# Patient Record
Sex: FEMALE | Race: WHITE | NOT HISPANIC OR LATINO | Employment: FULL TIME | URBAN - METROPOLITAN AREA
[De-identification: names, ages, dates, MRNs, and addresses within clinical notes are randomized per-mention and may not be internally consistent; named-entity substitution may affect disease eponyms.]

---

## 2021-07-05 ENCOUNTER — APPOINTMENT (OUTPATIENT)
Dept: CT IMAGING | Facility: HOSPITAL | Age: 26
End: 2021-07-05

## 2021-07-05 ENCOUNTER — HOSPITAL ENCOUNTER (EMERGENCY)
Facility: HOSPITAL | Age: 26
Discharge: HOME | End: 2021-07-05
Attending: EMERGENCY MEDICINE | Admitting: EMERGENCY MEDICINE

## 2021-07-05 VITALS
HEIGHT: 64 IN | SYSTOLIC BLOOD PRESSURE: 108 MMHG | RESPIRATION RATE: 16 BRPM | OXYGEN SATURATION: 98 % | WEIGHT: 135 LBS | HEART RATE: 67 BPM | TEMPERATURE: 98.2 F | DIASTOLIC BLOOD PRESSURE: 77 MMHG | BODY MASS INDEX: 23.05 KG/M2

## 2021-07-05 DIAGNOSIS — D64.9 ANEMIA, UNSPECIFIED TYPE: ICD-10-CM

## 2021-07-05 DIAGNOSIS — R10.31 RIGHT LOWER QUADRANT ABDOMINAL PAIN: Primary | ICD-10-CM

## 2021-07-05 LAB
ALBUMIN SERPL-MCNC: 3.7 G/DL (ref 3.5–5.2)
ALBUMIN/GLOB SERPL: 1.5 G/DL
ALP SERPL-CCNC: 45 U/L (ref 39–117)
ALT SERPL W P-5'-P-CCNC: 12 U/L (ref 1–33)
ANION GAP SERPL CALCULATED.3IONS-SCNC: 9.1 MMOL/L (ref 5–15)
AST SERPL-CCNC: 17 U/L (ref 1–32)
BASOPHILS # BLD AUTO: 0.05 10*3/MM3 (ref 0–0.2)
BASOPHILS NFR BLD AUTO: 0.9 % (ref 0–1.5)
BILIRUB SERPL-MCNC: 0.3 MG/DL (ref 0–1.2)
BUN SERPL-MCNC: 9 MG/DL (ref 6–20)
BUN/CREAT SERPL: 11.8 (ref 7–25)
CALCIUM SPEC-SCNC: 8.3 MG/DL (ref 8.6–10.5)
CHLORIDE SERPL-SCNC: 108 MMOL/L (ref 98–107)
CO2 SERPL-SCNC: 22.9 MMOL/L (ref 22–29)
CREAT SERPL-MCNC: 0.76 MG/DL (ref 0.57–1)
DEPRECATED RDW RBC AUTO: 41.5 FL (ref 37–54)
EOSINOPHIL # BLD AUTO: 0.1 10*3/MM3 (ref 0–0.4)
EOSINOPHIL NFR BLD AUTO: 1.8 % (ref 0.3–6.2)
ERYTHROCYTE [DISTWIDTH] IN BLOOD BY AUTOMATED COUNT: 12.9 % (ref 12.3–15.4)
GFR SERPL CREATININE-BSD FRML MDRD: 92 ML/MIN/1.73
GLOBULIN UR ELPH-MCNC: 2.5 GM/DL
GLUCOSE SERPL-MCNC: 85 MG/DL (ref 65–99)
HCG SERPL QL: NEGATIVE
HCT VFR BLD AUTO: 29.4 % (ref 34–46.6)
HGB BLD-MCNC: 9.9 G/DL (ref 12–15.9)
IMM GRANULOCYTES # BLD AUTO: 0.02 10*3/MM3 (ref 0–0.05)
IMM GRANULOCYTES NFR BLD AUTO: 0.4 % (ref 0–0.5)
LIPASE SERPL-CCNC: 40 U/L (ref 13–60)
LYMPHOCYTES # BLD AUTO: 1.82 10*3/MM3 (ref 0.7–3.1)
LYMPHOCYTES NFR BLD AUTO: 33.2 % (ref 19.6–45.3)
MCH RBC QN AUTO: 30.3 PG (ref 26.6–33)
MCHC RBC AUTO-ENTMCNC: 33.7 G/DL (ref 31.5–35.7)
MCV RBC AUTO: 89.9 FL (ref 79–97)
MONOCYTES # BLD AUTO: 0.65 10*3/MM3 (ref 0.1–0.9)
MONOCYTES NFR BLD AUTO: 11.9 % (ref 5–12)
NEUTROPHILS NFR BLD AUTO: 2.84 10*3/MM3 (ref 1.7–7)
NEUTROPHILS NFR BLD AUTO: 51.8 % (ref 42.7–76)
NRBC BLD AUTO-RTO: 0 /100 WBC (ref 0–0.2)
PLATELET # BLD AUTO: 316 10*3/MM3 (ref 140–450)
PMV BLD AUTO: 9.2 FL (ref 6–12)
POTASSIUM SERPL-SCNC: 3.7 MMOL/L (ref 3.5–5.2)
PROT SERPL-MCNC: 6.2 G/DL (ref 6–8.5)
RBC # BLD AUTO: 3.27 10*6/MM3 (ref 3.77–5.28)
SODIUM SERPL-SCNC: 140 MMOL/L (ref 136–145)
WBC # BLD AUTO: 5.48 10*3/MM3 (ref 3.4–10.8)

## 2021-07-05 PROCEDURE — 83690 ASSAY OF LIPASE: CPT | Performed by: EMERGENCY MEDICINE

## 2021-07-05 PROCEDURE — 84703 CHORIONIC GONADOTROPIN ASSAY: CPT | Performed by: EMERGENCY MEDICINE

## 2021-07-05 PROCEDURE — 85025 COMPLETE CBC W/AUTO DIFF WBC: CPT | Performed by: EMERGENCY MEDICINE

## 2021-07-05 PROCEDURE — 25010000002 IOPAMIDOL 61 % SOLUTION: Performed by: EMERGENCY MEDICINE

## 2021-07-05 PROCEDURE — 74177 CT ABD & PELVIS W/CONTRAST: CPT

## 2021-07-05 PROCEDURE — 80053 COMPREHEN METABOLIC PANEL: CPT | Performed by: EMERGENCY MEDICINE

## 2021-07-05 PROCEDURE — 99283 EMERGENCY DEPT VISIT LOW MDM: CPT

## 2021-07-05 RX ORDER — SODIUM CHLORIDE 0.9 % (FLUSH) 0.9 %
10 SYRINGE (ML) INJECTION AS NEEDED
Status: DISCONTINUED | OUTPATIENT
Start: 2021-07-05 | End: 2021-07-05 | Stop reason: HOSPADM

## 2021-07-05 RX ADMIN — IOPAMIDOL 85 ML: 612 INJECTION, SOLUTION INTRAVENOUS at 10:15

## 2021-07-05 NOTE — ED NOTES
Called CT to see why pt has not gone over for scans yet - spoke with Sulema who said this pt will be next. Pt updated.      Dimas Manuel RN  07/05/21 5077

## 2021-07-05 NOTE — ED TRIAGE NOTES
Right sided abd pain since waking at 0645.  Vomit x 1.    Patient was placed in face mask during first look triage.  Patient was wearing a face mask throughout encounter.  I wore personal protective equipment throughout the encounter.  Hand hygiene was performed before and after patient encounter.

## 2021-07-05 NOTE — ED TRIAGE NOTES
"Pt states her right side (\"uterine wall\") started hurting last night and the pain is now radiated into her right LQ. She has hx of ruptured ovarian cysts and states this feels similar. Pt describes the pain asa knife across her right side. No history of kidney stones. Pt still has her appendix and gallbladder.She is allergic to shrimp and dairy but has eaten neither. Pt denies vaginal discharge, urinary changes, constipation or diarrhea.     Patient was wearing a face mask when I entered the room and they continued to wear a mask throughout our encounter. I wore PPE including gloves, eye protection, and a surgical mask whenever I was in the room with patient. Hand hygiene performed.  "

## 2021-07-05 NOTE — ED NOTES
Pt called out to ask why she has not gone to CT yet and I informed her they are waiting for urine results to ensure she is not pregnant. I requested a serum HCG from Dr. Ward - labs sent. Pt verbalized understanding.      Dimas Manuel RN  07/05/21 0857

## 2021-07-05 NOTE — ED PROVIDER NOTES
" EMERGENCY DEPARTMENT ENCOUNTER    Room Number:  09/09  Date seen:  7/5/2021  PCP: Provider, No Known  Historian: Patient      HPI:  Chief Complaint: Abdominal pain  A complete HPI/ROS/PMH/PSH/SH/FH are unobtainable due to: Nothing  Context: Joseline Kelly is a 26 y.o. female who presents to the ED c/o right lower abdominal pain onset this morning around 6:45 AM.  It woke her from sleep.  The pain actually initially started \"in her uterus\".,  Specifically the patient describes that the pain seemed to be localized in her lower pelvis inside her vagina.  The pain then felt like a sharp knife going across her lower abdomen to the right lower quadrant.  She threw up once on the way here.  Her nausea has since resolved.  She denies fever or chills.  She denies dysuria.  She denies vaginal bleeding or vaginal discharge.  She is on oral contraceptive therapy.  Last menstrual period was exactly 1 month ago.  She denies being pregnant to her knowledge.  The pain is currently mild.  She experienced similar symptoms 1 time before and was diagnosed with an ovarian cyst that had ruptured.            PAST MEDICAL HISTORY  Active Ambulatory Problems     Diagnosis Date Noted   • No Active Ambulatory Problems     Resolved Ambulatory Problems     Diagnosis Date Noted   • No Resolved Ambulatory Problems     No Additional Past Medical History         PAST SURGICAL HISTORY  History reviewed. No pertinent surgical history.      FAMILY HISTORY  History reviewed. No pertinent family history.      SOCIAL HISTORY  Social History     Socioeconomic History   • Marital status: Single     Spouse name: Not on file   • Number of children: Not on file   • Years of education: Not on file   • Highest education level: Not on file   Tobacco Use   • Smoking status: Never Smoker   Substance and Sexual Activity   • Alcohol use: Yes   • Drug use: Never   • Sexual activity: Yes     Partners: Male     Birth control/protection: OCP "         ALLERGIES  Zithromax [azithromycin]        REVIEW OF SYSTEMS  Review of Systems   Review of all 14 systems is negative other than stated in the HPI above.      PHYSICAL EXAM  ED Triage Vitals   Temp Heart Rate Resp BP SpO2   07/05/21 0725 07/05/21 0725 07/05/21 0725 07/05/21 0736 07/05/21 0725   98.2 °F (36.8 °C) 99 16 120/80 98 %      Temp src Heart Rate Source Patient Position BP Location FiO2 (%)   07/05/21 0725 07/05/21 0725 -- -- --   Tympanic Monitor            GENERAL: Awake and alert, no acute distress  HENT: nares patent  EYES: no scleral icterus  CV: regular rhythm, normal rate  RESPIRATORY: normal effort  ABDOMEN: soft, mild tenderness in the right lower quadrant without rebound or guarding  MUSCULOSKELETAL: no deformity  NEURO: alert, moves all extremities, follows commands  PSYCH:  calm, cooperative  SKIN: warm, dry    Vital signs and nursing notes reviewed.          LAB RESULTS  Recent Results (from the past 24 hour(s))   Comprehensive Metabolic Panel    Collection Time: 07/05/21  7:59 AM    Specimen: Blood   Result Value Ref Range    Glucose 85 65 - 99 mg/dL    BUN 9 6 - 20 mg/dL    Creatinine 0.76 0.57 - 1.00 mg/dL    Sodium 140 136 - 145 mmol/L    Potassium 3.7 3.5 - 5.2 mmol/L    Chloride 108 (H) 98 - 107 mmol/L    CO2 22.9 22.0 - 29.0 mmol/L    Calcium 8.3 (L) 8.6 - 10.5 mg/dL    Total Protein 6.2 6.0 - 8.5 g/dL    Albumin 3.70 3.50 - 5.20 g/dL    ALT (SGPT) 12 1 - 33 U/L    AST (SGOT) 17 1 - 32 U/L    Alkaline Phosphatase 45 39 - 117 U/L    Total Bilirubin 0.3 0.0 - 1.2 mg/dL    eGFR Non African Amer 92 >60 mL/min/1.73    Globulin 2.5 gm/dL    A/G Ratio 1.5 g/dL    BUN/Creatinine Ratio 11.8 7.0 - 25.0    Anion Gap 9.1 5.0 - 15.0 mmol/L   Lipase    Collection Time: 07/05/21  7:59 AM    Specimen: Blood   Result Value Ref Range    Lipase 40 13 - 60 U/L   CBC Auto Differential    Collection Time: 07/05/21  7:59 AM    Specimen: Blood   Result Value Ref Range    WBC 5.48 3.40 - 10.80 10*3/mm3     RBC 3.27 (L) 3.77 - 5.28 10*6/mm3    Hemoglobin 9.9 (L) 12.0 - 15.9 g/dL    Hematocrit 29.4 (L) 34.0 - 46.6 %    MCV 89.9 79.0 - 97.0 fL    MCH 30.3 26.6 - 33.0 pg    MCHC 33.7 31.5 - 35.7 g/dL    RDW 12.9 12.3 - 15.4 %    RDW-SD 41.5 37.0 - 54.0 fl    MPV 9.2 6.0 - 12.0 fL    Platelets 316 140 - 450 10*3/mm3    Neutrophil % 51.8 42.7 - 76.0 %    Lymphocyte % 33.2 19.6 - 45.3 %    Monocyte % 11.9 5.0 - 12.0 %    Eosinophil % 1.8 0.3 - 6.2 %    Basophil % 0.9 0.0 - 1.5 %    Immature Grans % 0.4 0.0 - 0.5 %    Neutrophils, Absolute 2.84 1.70 - 7.00 10*3/mm3    Lymphocytes, Absolute 1.82 0.70 - 3.10 10*3/mm3    Monocytes, Absolute 0.65 0.10 - 0.90 10*3/mm3    Eosinophils, Absolute 0.10 0.00 - 0.40 10*3/mm3    Basophils, Absolute 0.05 0.00 - 0.20 10*3/mm3    Immature Grans, Absolute 0.02 0.00 - 0.05 10*3/mm3    nRBC 0.0 0.0 - 0.2 /100 WBC   hCG, Serum, Qualitative    Collection Time: 21  8:55 AM    Specimen: Blood   Result Value Ref Range    HCG Qualitative Negative Negative       Ordered the above labs and reviewed the results.        RADIOLOGY  CT Abdomen Pelvis With Contrast    Result Date: 2021  CT SCAN OF THE ABDOMEN AND PELVIS WITH INTRAVENOUS CONTRAST  HISTORY: Right lower quadrant pain. History of ovarian cysts.  FINDINGS: The CT scan was performed as an emergency procedure through the abdomen and pelvis with intravenous contrast and demonstrates the followin. The kidneys are normal in size and there is no evidence of renal stone or obstruction. There is a very tiny left renal cyst. 2. The lung bases are clear. The liver, spleen, pancreas, and both adrenal glands are unremarkable. The gallbladder appears normal. The aorta and retroperitoneal structures appear normal. 3. The large and small bowel loops are normal in caliber and show no inflammatory change. No abnormality is seen in the region of the appendix. 4. In the pelvis the uterus is normal in size. The adnexal structures are unremarkable  by CT scan. There is no evidence of dominant are complicated ovarian cyst or free fluid.      Radiation dose reduction techniques were utilized, including automated exposure control and exposure modulation based on body size.  This report was finalized on 7/5/2021 11:22 AM by Dr. Mikhail Scanlon M.D.        Ordered the above noted radiological studies. Reviewed by me in PACS.            PROCEDURES  Procedures            MEDICATIONS GIVEN IN ER  Medications   iopamidol (ISOVUE-300) 61 % injection 100 mL (85 mL Intravenous Given 7/5/21 1015)                   MEDICAL DECISION MAKING, PROGRESS, and CONSULTS    All labs have been independently reviewed by me.  All radiology studies have been reviewed by me and discussed with radiologist dictating the report.   EKG's independently viewed and interpreted by me.  Discussion below represents my analysis of pertinent findings related to patient's condition, differential diagnosis, treatment plan and final disposition.      Differential diagnosis includes but is not limited to:  Ovarian cyst with hemorrhage or rupture  Acute appendicitis  Ovarian torsion  Ectopic pregnancy  Tubo-ovarian abscess  Ureteral calculus      ED Course as of Jul 05 1348   Mon Jul 05, 2021   1007 HCG Qualitative: Negative [JR]   1007 Glucose: 85 [JR]   1007 Chloride(!): 108 [JR]   1007 Hemoglobin(!): 9.9 [JR]   1007 Hematocrit(!): 29.4 [JR]   1007 WBC: 5.48 [JR]   1007 Lipase: 40 [JR]   1100 I discussed the CT abdomen with Dr. Scanlon, radiologist, who reports no acute intra-abdominal findings, normal appendix, no significant free fluid in pelvis.    [JR]   1109 Patient reassessed.  Her pain is much improved.  I explained that her CT abdomen appears reassuring.  I explained that she may have had a small cyst rupture but this is only speculation.  I also explained that I cannot completely exclude ovarian torsion although her ovary is not appear abnormal on the CT scan.  I offered a pelvic ultrasound  for further evaluation but patient reports that she prefers to follow-up with her OB/GYN.  I explained that given her pain has mostly subsided at this time that is not unreasonable, but I cautioned her that if her pain becomes more intense and severe as it was earlier today, she should return to the ER immediately for consideration of pelvic ultrasound to further evaluate for intermittent torsion.  Patient voiced understanding.  Additionally, I informed her of her hemoglobin 9.9 today.  She denies previous history of anemia.  She did donate blood about 2 weeks ago which is likely contributing to her mild anemia today.  She denies history of heavy menstrual.  She denies history of black or bloody stool recently.  I recommend she take a daily iron supplement and follow-up with her PCP.    [JR]      ED Course User Index  [JR] Darío Ward MD              I wore a mask, face shield, and gloves during this patient encounter.  Patient also wearing a surgical mask.  Hand hygeine performed before and after seeing the patient.    DIAGNOSIS  Final diagnoses:   Right lower quadrant abdominal pain   Anemia, unspecified type         DISPOSITION  DISCHARGE    Patient discharged in stable condition.    Reviewed implications of results, diagnosis, meds, responsibility to follow up, warning signs and symptoms of possible worsening, potential complications and reasons to return to ER.    Patient/Family voiced understanding of above instructions.    Discussed plan for discharge, as there is no emergent indication for admission. Patient referred to primary care provider for BP management due to today's BP. Pt/family is agreeable and understands need for follow up and repeat testing.  Pt is aware that discharge does not mean that nothing is wrong but it indicates no emergency is present that requires admission and they must continue care with follow-up as given below or physician of their choice.     FOLLOW-UP  PATIENT  [facility] [address]  [phone]  Call in 1 day  or follow up with your OB/ GYN         Medication List      No changes were made to your prescriptions during this visit.                   Latest Documented Vital Signs:  As of 13:48 EDT  BP- 108/77 HR- 67 Temp- 98.2 °F (36.8 °C) (Tympanic) O2 sat- 98%        --    Please note that portions of this were completed with a voice recognition program.          Darío Ward MD  07/05/21 3800

## 2022-05-25 ENCOUNTER — OFFICE VISIT (OUTPATIENT)
Dept: OBSTETRICS AND GYNECOLOGY | Facility: CLINIC | Age: 27
End: 2022-05-25

## 2022-05-25 VITALS
SYSTOLIC BLOOD PRESSURE: 108 MMHG | WEIGHT: 149 LBS | BODY MASS INDEX: 25.44 KG/M2 | DIASTOLIC BLOOD PRESSURE: 64 MMHG | HEIGHT: 64 IN

## 2022-05-25 DIAGNOSIS — R10.84 GENERALIZED ABDOMINAL PAIN: ICD-10-CM

## 2022-05-25 DIAGNOSIS — E28.2 POLYCYSTIC OVARIAN SYNDROME: Primary | ICD-10-CM

## 2022-05-25 PROCEDURE — 99203 OFFICE O/P NEW LOW 30 MIN: CPT | Performed by: OBSTETRICS & GYNECOLOGY

## 2022-05-25 RX ORDER — NORETHINDRONE ACETATE AND ETHINYL ESTRADIOL 1MG-20(21)
1 KIT ORAL DAILY
Qty: 28 TABLET | Refills: 12 | Status: SHIPPED | OUTPATIENT
Start: 2022-05-25 | End: 2022-06-15

## 2022-05-25 RX ORDER — DROSPIRENONE AND ETHINYL ESTRADIOL 0.03MG-3MG
KIT ORAL
COMMUNITY
Start: 2022-05-21 | End: 2022-05-25

## 2022-05-25 NOTE — PROGRESS NOTES
Subjective   Joseline Kelly is a 27 y.o. female.     Cc:  Establish with new gynecologist    History of Present Illness - Patient is a 27 year old nulligravid female who presents to establish care with new gynecologist after moving from [place] to [place].  She has a long history of PCOS, managed with OCP.  She has symptoms of hair growth, mood/weight changes and recurrent ovarian cysts.  She was last on Halina OCP and has had a 2 week lapse in pills.  She is wanting to change to different pill.  She has tried IUD in past for symptoms without good success.  She is currently sexually active and declines STD testing, denies exposure.  She reports history of abnormal pap but states pap was normal 6 months ago.  Patient reports recurrent nausea, GI issues.    The following portions of the patient's history were reviewed and updated as appropriate:   She  has a past medical history of Abnormal Pap smear of cervix, COVID-19 (11/2020), HPV (human papilloma virus) infection, and Polycystic ovary syndrome.  She  has a past surgical history that includes Colposcopy (2018).  Her family history includes Breast cancer in her mother; Heart attack in her paternal grandfather; Hyperlipidemia in her father; Migraine headaches in her father; Multiple sclerosis in her maternal grandmother; No Known Problems in her sister; Thyroid disease in her paternal grandmother.  She  reports that she has never smoked. She does not have any smokeless tobacco history on file. She reports current alcohol use. She reports that she does not use drugs.  Current Outpatient Medications   Medication Sig Dispense Refill   • norethindrone-ethinyl estradiol FE (Junel FE 1/20) 1-20 MG-MCG per tablet Take 1 tablet by mouth Daily. 28 tablet 12     No current facility-administered medications for this visit.     She is allergic to azithromycin..    Review of Systems   Constitutional: Negative for chills and fever.   Gastrointestinal: Positive for  abdominal pain and nausea.   Genitourinary: Negative for menstrual problem.       Objective   Physical Exam  Vitals reviewed. Exam conducted with a chaperone present.   Constitutional:       Appearance: Normal appearance.   HENT:      Right Ear: External ear normal.      Left Ear: External ear normal.      Nose: Nose normal.   Eyes:      Conjunctiva/sclera: Conjunctivae normal.   Cardiovascular:      Rate and Rhythm: Normal rate.   Pulmonary:      Effort: Pulmonary effort is normal.   Abdominal:      Tenderness: There is no abdominal tenderness. There is no guarding or rebound.   Musculoskeletal:         General: Normal range of motion.      Cervical back: Normal range of motion.   Skin:     General: Skin is warm.   Neurological:      General: No focal deficit present.      Mental Status: She is alert.      Coordination: Coordination normal.   Psychiatric:         Mood and Affect: Mood normal.         Behavior: Behavior normal.         Thought Content: Thought content normal.         Judgment: Judgment normal.         Assessment & Plan   Diagnoses and all orders for this visit:    1. Polycystic ovarian syndrome (Primary)  -     norethindrone-ethinyl estradiol FE (Junel FE 1/20) 1-20 MG-MCG per tablet; Take 1 tablet by mouth Daily.  Dispense: 28 tablet; Refill: 12  -     Discussed management of PCOS works well with OCP.  This especially true with patient with dermatologic manifestations.      2.  Abdominal Pain        -     GI referral ordered.

## 2022-05-27 ENCOUNTER — PATIENT MESSAGE (OUTPATIENT)
Dept: OBSTETRICS AND GYNECOLOGY | Facility: CLINIC | Age: 27
End: 2022-05-27

## 2022-05-27 ENCOUNTER — PATIENT ROUNDING (BHMG ONLY) (OUTPATIENT)
Dept: OBSTETRICS AND GYNECOLOGY | Facility: CLINIC | Age: 27
End: 2022-05-27

## 2022-05-27 NOTE — PROGRESS NOTES
My chart message has been sent to the patient for PATIENT ROUNDING with [facility].

## 2022-06-09 ENCOUNTER — TELEPHONE (OUTPATIENT)
Dept: OBSTETRICS AND GYNECOLOGY | Facility: CLINIC | Age: 27
End: 2022-06-09

## 2022-06-09 NOTE — TELEPHONE ENCOUNTER
Left message for patient to call office. Does she need her records from Dr. Valencia sent to her? If so, please confirm address. Thanks-Valarie

## 2022-06-15 ENCOUNTER — OFFICE VISIT (OUTPATIENT)
Dept: GASTROENTEROLOGY | Facility: CLINIC | Age: 27
End: 2022-06-15

## 2022-06-15 ENCOUNTER — PATIENT ROUNDING (BHMG ONLY) (OUTPATIENT)
Dept: GASTROENTEROLOGY | Facility: CLINIC | Age: 27
End: 2022-06-15

## 2022-06-15 VITALS
TEMPERATURE: 98.4 F | DIASTOLIC BLOOD PRESSURE: 78 MMHG | SYSTOLIC BLOOD PRESSURE: 114 MMHG | HEIGHT: 64 IN | BODY MASS INDEX: 25.95 KG/M2 | OXYGEN SATURATION: 96 % | HEART RATE: 83 BPM | WEIGHT: 152 LBS

## 2022-06-15 DIAGNOSIS — R14.0 BLOATING SYMPTOM: Primary | ICD-10-CM

## 2022-06-15 DIAGNOSIS — R19.7 DIARRHEA, UNSPECIFIED TYPE: ICD-10-CM

## 2022-06-15 DIAGNOSIS — R12 HEARTBURN: ICD-10-CM

## 2022-06-15 DIAGNOSIS — R11.0 NAUSEA: ICD-10-CM

## 2022-06-15 DIAGNOSIS — K62.5 RECTAL BLEEDING: ICD-10-CM

## 2022-06-15 PROCEDURE — 99204 OFFICE O/P NEW MOD 45 MIN: CPT | Performed by: NURSE PRACTITIONER

## 2022-06-15 NOTE — PROGRESS NOTES
Jackie 15, 2022    Hello, may I speak with Joseline Kelly?    My name is Sushila      I am  with [facility]  [address]  [place] [address] [phone].    Before we get started may I verify your date of birth? 1995    I am calling to officially welcome you to our practice and ask about your recent visit. Is this a good time to talk?Yes     Tell me about your visit with us. What things went well?  Everything went really well.  Diana was very easy to talk to       We're always looking for ways to make our patients' experiences even better. Do you have recommendations on ways we may improve? Patient didn't like that she felt pressured to pay for her lab work while she was in office.  Explained that was a policy of [facility].    Overall were you satisfied with your first visit to our practice? Yes     I appreciate you taking the time to speak with me today. Is there anything else I can do for you? Patient wanted the number to financial services so she can get an estimated OOP for her scopes that were ordered- this was provided       Thank you, and have a great day.

## 2022-06-15 NOTE — PROGRESS NOTES
Chief Complaint   Patient presents with   • Bloated       HPI    Joseline Kelly is a  27 y.o. female here to establish care as a new patient for multiple GI complaints.    This patient will also follow with Dr. Ibrahim.    She has a past medical history of polycystic ovarian syndrome, COVID-19, and HPV.  She was referred by her gynecologist.    Patient reports issues with digestive symptoms dating back to high school.  Worse over the last 1 year.  Symptoms include intermittent rectal bleeding with bright red blood in the toilet paper, episodic diarrhea, generalized abdominal bloating, nausea, early satiety, and heartburn.  Symptoms made worse with dairy products.  She has had food allergy testing and was told she was allergic to shellfish.    No vomiting, poor appetite, or weight loss in fact she has gained 20 pounds of the past 1 year.    She still has her gallbladder.    No family history of colon cancer.  Her sister has celiac disease.    Past Medical History:   Diagnosis Date   • Abnormal Pap smear of cervix    • COVID-19 11/2020 1/2022   • HPV (human papilloma virus) infection    • Polycystic ovary syndrome        Past Surgical History:   Procedure Laterality Date   • COLPOSCOPY  2018   • WISDOM TOOTH EXTRACTION         Scheduled Meds:     Continuous Infusions: No current facility-administered medications for this visit.      PRN Meds:     Allergies   Allergen Reactions   • Azithromycin Hives       Social History     Socioeconomic History   • Marital status: Single   Tobacco Use   • Smoking status: Never Smoker   • Smokeless tobacco: Never Used   Vaping Use   • Vaping Use: Never used   Substance and Sexual Activity   • Alcohol use: Yes     Comment: social   • Drug use: Never   • Sexual activity: Yes     Partners: Male       Family History   Problem Relation Age of Onset   • Breast cancer Mother    • Hyperlipidemia Father    • Migraine headaches Father    • No Known Problems Sister    • Multiple sclerosis  Maternal Grandmother    • Thyroid disease Paternal Grandmother    • Heart attack Paternal Grandfather    • Colon cancer Paternal Great-Grandfather        Review of Systems   Constitutional: Negative for activity change, appetite change, fatigue and unexpected weight change.   HENT: Negative for trouble swallowing.    Eyes: Negative.    Respiratory: Negative.    Cardiovascular: Negative.    Gastrointestinal: Positive for diarrhea and nausea. Negative for abdominal distention, abdominal pain, anal bleeding, blood in stool, constipation, rectal pain and vomiting.        + bloating   Endocrine: Negative.    Genitourinary: Negative.    Musculoskeletal: Negative.    Allergic/Immunologic: Negative.    Neurological: Negative.    Hematological: Negative.    Psychiatric/Behavioral: Negative.        Vitals:    06/15/22 1302   BP: 114/78   Pulse: 83   Temp: 98.4 °F (36.9 °C)   SpO2: 96%       Physical Exam  Constitutional:       Appearance: She is well-developed.   Abdominal:      General: Bowel sounds are normal. There is no distension.      Palpations: Abdomen is soft. There is no mass.      Tenderness: There is no abdominal tenderness. There is no guarding.      Hernia: No hernia is present.   Skin:     General: Skin is warm and dry.      Capillary Refill: Capillary refill takes less than 2 seconds.   Neurological:      Mental Status: She is alert and oriented to person, place, and time.   Psychiatric:         Behavior: Behavior normal.     Assessment    Diagnoses and all orders for this visit:    1. Bloating symptom (Primary)  -     CBC & Differential  -     Comprehensive Metabolic Panel  -     C-reactive Protein  -     Sedimentation Rate  -     Celiac Comprehensive Panel  -     Food Allergy Profile  -     TSH  -     Case Request; Standing  -     Case Request    2. Diarrhea, unspecified type  -     CBC & Differential  -     Comprehensive Metabolic Panel  -     C-reactive Protein  -     Sedimentation Rate  -     Celiac  Comprehensive Panel  -     Food Allergy Profile  -     TSH  -     Case Request; Standing  -     Case Request    3. Rectal bleeding  -     CBC & Differential  -     Comprehensive Metabolic Panel  -     C-reactive Protein  -     Sedimentation Rate  -     Celiac Comprehensive Panel  -     Food Allergy Profile  -     TSH  -     Case Request; Standing  -     Case Request    4. Nausea  -     CBC & Differential  -     Comprehensive Metabolic Panel  -     C-reactive Protein  -     Sedimentation Rate  -     Celiac Comprehensive Panel  -     Food Allergy Profile  -     TSH  -     Case Request; Standing  -     Case Request    5. Heartburn  -     CBC & Differential  -     Comprehensive Metabolic Panel  -     C-reactive Protein  -     Sedimentation Rate  -     Celiac Comprehensive Panel  -     Food Allergy Profile  -     TSH  -     Case Request; Standing  -     Case Request       Plan    Arrange EGD and colonoscopy with Dr. Ibrahim for the above-mentioned symptoms.    Patient would like out-of-pocket expense before booking procedure and this was discussed at checkout.  Risk/benefits of procedure reviewed with patient all questions were answered.    Labs today as above.  Further recommendations and follow-up pending aforementioned work-up.         DAKOTA Grajeda  [facility]  [address] [place] [address]  Office: [phone]

## 2022-06-16 LAB
ALBUMIN SERPL-MCNC: 3.8 G/DL (ref 3.9–5)
ALBUMIN/GLOB SERPL: 1.5 {RATIO} (ref 1.2–2.2)
ALP SERPL-CCNC: 66 IU/L (ref 44–121)
ALT SERPL-CCNC: 12 IU/L (ref 0–32)
AST SERPL-CCNC: 13 IU/L (ref 0–40)
BASOPHILS # BLD AUTO: 0.1 X10E3/UL (ref 0–0.2)
BASOPHILS NFR BLD AUTO: 1 %
BILIRUB SERPL-MCNC: 0.2 MG/DL (ref 0–1.2)
BUN SERPL-MCNC: 9 MG/DL (ref 6–20)
BUN/CREAT SERPL: 13 (ref 9–23)
CALCIUM SERPL-MCNC: 8.9 MG/DL (ref 8.7–10.2)
CHLORIDE SERPL-SCNC: 106 MMOL/L (ref 96–106)
CO2 SERPL-SCNC: 22 MMOL/L (ref 20–29)
CREAT SERPL-MCNC: 0.67 MG/DL (ref 0.57–1)
CRP SERPL-MCNC: <1 MG/L (ref 0–10)
EGFRCR SERPLBLD CKD-EPI 2021: 123 ML/MIN/1.73
ENDOMYSIUM IGA SER QL: NEGATIVE
EOSINOPHIL # BLD AUTO: 0.1 X10E3/UL (ref 0–0.4)
EOSINOPHIL NFR BLD AUTO: 1 %
ERYTHROCYTE [DISTWIDTH] IN BLOOD BY AUTOMATED COUNT: 12.1 % (ref 11.7–15.4)
ERYTHROCYTE [SEDIMENTATION RATE] IN BLOOD BY WESTERGREN METHOD: 2 MM/HR (ref 0–32)
GLIADIN PEPTIDE IGA SER-ACNC: 6 UNITS (ref 0–19)
GLIADIN PEPTIDE IGG SER-ACNC: 2 UNITS (ref 0–19)
GLOBULIN SER CALC-MCNC: 2.6 G/DL (ref 1.5–4.5)
GLUCOSE SERPL-MCNC: 75 MG/DL (ref 65–99)
HCT VFR BLD AUTO: 37.8 % (ref 34–46.6)
HGB BLD-MCNC: 12.6 G/DL (ref 11.1–15.9)
IGA SERPL-MCNC: 194 MG/DL (ref 87–352)
IMM GRANULOCYTES # BLD AUTO: 0 X10E3/UL (ref 0–0.1)
IMM GRANULOCYTES NFR BLD AUTO: 0 %
LYMPHOCYTES # BLD AUTO: 2.1 X10E3/UL (ref 0.7–3.1)
LYMPHOCYTES NFR BLD AUTO: 34 %
MCH RBC QN AUTO: 31.1 PG (ref 26.6–33)
MCHC RBC AUTO-ENTMCNC: 33.3 G/DL (ref 31.5–35.7)
MCV RBC AUTO: 93 FL (ref 79–97)
MONOCYTES # BLD AUTO: 0.6 X10E3/UL (ref 0.1–0.9)
MONOCYTES NFR BLD AUTO: 10 %
NEUTROPHILS # BLD AUTO: 3.4 X10E3/UL (ref 1.4–7)
NEUTROPHILS NFR BLD AUTO: 54 %
PLATELET # BLD AUTO: 250 X10E3/UL (ref 150–450)
POTASSIUM SERPL-SCNC: 4 MMOL/L (ref 3.5–5.2)
PROT SERPL-MCNC: 6.4 G/DL (ref 6–8.5)
RBC # BLD AUTO: 4.05 X10E6/UL (ref 3.77–5.28)
SODIUM SERPL-SCNC: 141 MMOL/L (ref 134–144)
TSH SERPL DL<=0.005 MIU/L-ACNC: 2.4 UIU/ML (ref 0.45–4.5)
TTG IGA SER-ACNC: <2 U/ML (ref 0–3)
TTG IGG SER-ACNC: <2 U/ML (ref 0–5)
WBC # BLD AUTO: 6.2 X10E3/UL (ref 3.4–10.8)

## 2022-06-22 ENCOUNTER — TELEPHONE (OUTPATIENT)
Dept: GASTROENTEROLOGY | Facility: CLINIC | Age: 27
End: 2022-06-22

## 2022-06-22 LAB
CLAM IGE QN: <0.1 KU/L
CODFISH IGE QN: <0.1 KU/L
CONV CLASS DESCRIPTION: ABNORMAL
CORN IGE QN: <0.1 KU/L
COW MILK IGE QN: <0.1 KU/L
EGG WHITE IGE QN: <0.1 KU/L
PEANUT IGE QN: <0.1 KU/L
SCALLOP IGE QN: <0.1 KU/L
SESAME SEED IGE QN: 0.14 KU/L
SHRIMP IGE QN: <0.1 KU/L
SOYBEAN IGE QN: <0.1 KU/L
WALNUT IGE QN: <0.1 KU/L
WHEAT IGE QN: <0.1 KU/L

## 2022-06-22 NOTE — TELEPHONE ENCOUNTER
----- Message from Joseline Kelly sent at 6/22/2022  1:46 PM EDT -----  Regarding: Lab results  Can someone call to discuss. This is concerning and I am taken back I only received a message about this.

## 2022-06-22 NOTE — TELEPHONE ENCOUNTER
Patient called. She states she recently saw an allergist and she reacted to shrimp and questions why it did not show up on her allergy test. Advised with the skin test is more sensitive than a blood test.   Patient states she would like to move forward with her EGD and colonoscopy but would like to know what her out of pocket cost would be. Advised will send an update to our insurance persons.   She verb understanding.   Update to Russel.

## 2022-08-01 ENCOUNTER — TELEPHONE (OUTPATIENT)
Dept: GASTROENTEROLOGY | Facility: CLINIC | Age: 27
End: 2022-08-01

## 2023-07-06 PROBLEM — E28.2 PCOS (POLYCYSTIC OVARIAN SYNDROME): Status: ACTIVE | Noted: 2023-07-06

## 2023-07-06 PROBLEM — T78.1XXA GASTROINTESTINAL FOOD SENSITIVITY: Status: ACTIVE | Noted: 2023-07-06

## 2023-07-06 PROBLEM — Z00.00 ANNUAL PHYSICAL EXAM: Status: ACTIVE | Noted: 2023-07-06

## 2024-01-23 ENCOUNTER — OFFICE VISIT (OUTPATIENT)
Dept: INTERNAL MEDICINE | Facility: CLINIC | Age: 29
End: 2024-01-23
Payer: COMMERCIAL

## 2024-01-23 VITALS
WEIGHT: 166 LBS | HEART RATE: 83 BPM | SYSTOLIC BLOOD PRESSURE: 120 MMHG | DIASTOLIC BLOOD PRESSURE: 70 MMHG | HEIGHT: 63 IN | OXYGEN SATURATION: 97 % | BODY MASS INDEX: 29.41 KG/M2

## 2024-01-23 DIAGNOSIS — M79.621 TENDERNESS OF RIGHT AXILLA: Primary | ICD-10-CM

## 2024-01-23 DIAGNOSIS — Z80.3 FAMILY HISTORY OF BREAST CANCER: ICD-10-CM

## 2024-01-23 PROCEDURE — 99213 OFFICE O/P EST LOW 20 MIN: CPT | Performed by: NURSE PRACTITIONER

## 2024-01-23 NOTE — PROGRESS NOTES
"Chief Complaint  Mass    Subjective        Joseline Kelly presents to [facility]  History of Present Illness  This is a 29 y/o female presenting to office for complaints of swelling under right axilla. Reports she notices some increased swelling intermittently. Has also noticed some tenderness when this occurs. Reports it is deep into the axilla. Denies any recent musculoskeletal injury or tear. Reports she does not notice a difference after shaving. Denies any pustules, cystic like lesions, or drainage.    Objective   Vital Signs:  /70 (BP Location: Left arm, Patient Position: Sitting, Cuff Size: Adult)   Pulse 83   Ht 160 cm (63\")   Wt 75.3 kg (166 lb)   SpO2 97%   BMI 29.41 kg/m²   Estimated body mass index is 29.41 kg/m² as calculated from the following:    Height as of this encounter: 160 cm (63\").    Weight as of this encounter: 75.3 kg (166 lb).               Physical Exam  Constitutional:       Appearance: Normal appearance.   HENT:      Head: Normocephalic and atraumatic.   Eyes:      Conjunctiva/sclera: Conjunctivae normal.      Pupils: Pupils are equal, round, and reactive to light.   Pulmonary:      Effort: Pulmonary effort is normal.   Chest:       Neurological:      Mental Status: She is alert and oriented to person, place, and time. Mental status is at baseline.   Psychiatric:         Mood and Affect: Mood normal.         Thought Content: Thought content normal.         Judgment: Judgment normal.        Result Review :    The following data was reviewed by: DAKOTA Mendoza on 01/23/2024:  Common labs          7/20/2023    09:00   Common Labs   Glucose 89    BUN 8    Creatinine 0.83    Sodium 142    Potassium 4.3    Chloride 108    Calcium 9.2    Total Protein 6.6    Albumin 4.4    Total Bilirubin 0.6    Alkaline Phosphatase 71    AST (SGOT) 15    ALT (SGPT) 16    WBC 5.79    Hemoglobin 13.1    Hematocrit 38.4    Platelets 273    Total Cholesterol 145  "   Triglycerides 60    HDL Cholesterol 50    LDL Cholesterol  83    Hemoglobin A1C 4.80      Family History   Problem Relation Age of Onset    Breast cancer Mother     Cancer Mother         Breast Cancer (3)    Hyperlipidemia Father     Migraine headaches Father     No Known Problems Sister     Multiple sclerosis Maternal Grandmother     Cancer Maternal Grandmother         Breast Cancer    Other Maternal Grandmother         Multiple Sclerosis    Thyroid disease Paternal Grandmother     Heart attack Paternal Grandfather     Other Paternal Grandfather         Heart Attack    Colon cancer Paternal Great-Grandfather     Cancer Paternal Uncle         Testicular Cancer    Hyperlipidemia Paternal Uncle     Hyperlipidemia Paternal Uncle                  Assessment and Plan     Diagnoses and all orders for this visit:    1. Tenderness of right axilla (Primary)  -     US Axilla Right; Future  -     US breast right limited; Future    2. Family history of breast cancer  -     Ambulatory Referral to High Risk Breast (EVA, PAD)    Will get US of area and also add on breast US to assess axillary tail of breast due to higher risk of BC.   Also will refer to high risk breast clinic for further guidance regarding screening and surveillance.          Follow Up     Return for Next scheduled follow up 7/11/24.  Patient was given instructions and counseling regarding her condition or for health maintenance advice. Please see specific information pulled into the AVS if appropriate.

## 2024-02-06 ENCOUNTER — HOSPITAL ENCOUNTER (OUTPATIENT)
Dept: ULTRASOUND IMAGING | Facility: HOSPITAL | Age: 29
Discharge: HOME | End: 2024-02-06
Payer: COMMERCIAL

## 2024-02-06 DIAGNOSIS — M79.621 TENDERNESS OF RIGHT AXILLA: ICD-10-CM

## 2024-02-06 DIAGNOSIS — Z80.3 FAMILY HISTORY OF BREAST CANCER: ICD-10-CM

## 2024-02-06 DIAGNOSIS — M79.621 TENDERNESS OF RIGHT AXILLA: Primary | ICD-10-CM

## 2024-02-06 PROCEDURE — 76882 US LMTD JT/FCL EVL NVASC XTR: CPT

## 2024-02-06 NOTE — PROGRESS NOTES
Please let patient know-  Axilla US came back as unremarkable. But they are recommending MRI of breast. I have went ahead and ordered this for further evaluation. I have also referred her to high risk breast clinic

## 2024-02-21 ENCOUNTER — OFFICE VISIT (OUTPATIENT)
Dept: MAMMOGRAPHY | Facility: CLINIC | Age: 29
End: 2024-02-21
Payer: COMMERCIAL

## 2024-02-21 VITALS
WEIGHT: 160 LBS | HEIGHT: 63 IN | BODY MASS INDEX: 28.35 KG/M2 | DIASTOLIC BLOOD PRESSURE: 99 MMHG | OXYGEN SATURATION: 98 % | SYSTOLIC BLOOD PRESSURE: 135 MMHG | HEART RATE: 83 BPM

## 2024-02-21 DIAGNOSIS — Z12.39 SCREENING BREAST EXAMINATION: ICD-10-CM

## 2024-02-21 DIAGNOSIS — Z91.89 AT HIGH RISK FOR BREAST CANCER: Primary | ICD-10-CM

## 2024-02-21 NOTE — PROGRESS NOTES
Chief Complaint: Joseline Kelly is a 29 y.o.. female here today for Consult        History of Present Illness:  Patient presents with management of breast cancer risk.   She is a nice 29-year-old white female who moved here from [place] in 2021.  She has a very strong family history for breast cancer.  Her mother was first diagnosed at the age of 30.  She then developed cancer in the other breast at the age of 53 and then had an axillary recurrence on the original side in her 60s.  The mother did have genetic testing at  in 2019 which was negative.  The patient's maternal grandmother also had breast cancer diagnosed at the age of 40 and there was a maternal great uncle that had breast cancer.    The patient has not had any breast biopsies and has had no children.  She is on birth control.    There was the question of a palpable abnormality in the right axilla and a recent ultrasound of the axilla shows some normal-appearing lymph nodes both in size and architecture.    Review of Systems:  Review of Systems   Skin:         The patient denies any noticeable changes to the skin of the breast.   All other systems reviewed and are negative.     I have reviewed the ROS as documented by the MA/LPN/RN Abe Brenner MD      Past Medical and Surgical History:  Breast Biopsy History:  Patient has not had a breast biopsy in the past.  Breast Cancer HIstory:  Patient does not have a past medical history of breast cancer.  Breast Operations, and year:  0  Social History     Tobacco Use   Smoking Status Never    Passive exposure: Never   Smokeless Tobacco Never     Obstetric History:  Patient is premenopausal, first day of last period: 2-  Number of pregnancies:0  Number of live births: 0  Number of abortions or miscarriages: 0  Age of delivery of first child: 0  Patient did not breast feed.  Length of time taking birth control pills IUD  Patient has never taken hormone replacement    Past Surgical  History:   Procedure Laterality Date    COLPOSCOPY  2018    WISDOM TOOTH EXTRACTION         Past Medical History:   Diagnosis Date    Abnormal Pap smear of cervix     Allergic     [facility]    COVID-19 11/2020 1/2022    GERD (gastroesophageal reflux disease)     HPV (human papilloma virus) infection     Polycystic ovary syndrome        Prior Hospitalizations, other than for surgery or childbirth, and year:  None    Social History:  Patient is single.  Patient has no children.    Family History:  Family History   Problem Relation Age of Onset    Breast cancer Mother     Cancer Mother         Breast Cancer (3)    Hyperlipidemia Father     Migraine headaches Father     No Known Problems Sister     Multiple sclerosis Maternal Grandmother     Cancer Maternal Grandmother         Breast Cancer    Other Maternal Grandmother         Multiple Sclerosis    Thyroid disease Paternal Grandmother     Heart attack Paternal Grandfather     Other Paternal Grandfather         Heart Attack    Colon cancer Paternal Great-Grandfather     Cancer Paternal Uncle         Testicular Cancer    Hyperlipidemia Paternal Uncle     Hyperlipidemia Paternal Uncle        Vital Signs:  Vitals:    02/21/24 1332   BP: 135/99   Pulse: 83   SpO2: 98%       Medications:    Current Outpatient Prescriptions:     Current Outpatient Medications:     fluticasone (FLONASE) 50 MCG/ACT nasal spray, 2 sprays into the nostril(s) as directed by provider Daily for 30 days. (Patient taking differently: 2 sprays into the nostril(s) as directed by provider Daily. PATIENT IS STILL TAKING), Disp: 16 g, Rfl: 0    Physical Examination:  General Appearance:   Patient is in no distress.  She is well kept and has a BMI of 28.4.  Psychiatric:  Patient with appropriate mood and affect. Alert and oriented to self, time, and place.    Breast, RIGHT:  medium sized, symmetric with the contralateral side.  Breast skin is without erythema, edema, rashes.  There are no visible  abnormalities upon inspection during the arm-raising maneuver or with hands on hips in the sitting position. There is no nipple retraction, discharge or nipple/areolar skin changes.There are no masses palpable in the sitting or supine positions.  The breast tissue is generally on the dense side.    Breast, LEFT:  medium sized, symmetric with the contralateral side.  Breast skin is without erythema, edema, rashes.  There are no visible abnormalities upon inspection during the arm-raising maneuver or with hands on hips in the sitting position. There is no nipple retraction, discharge or nipple/areolar skin changes.There are no masses palpable in the sitting or supine positions.  She has a similar pattern of dense breast tissue on this side as well.    Lymphatic:  There is no axillary, cervical, infraclavicular, or supraclavicular adenopathy bilaterally.    Gastrointestinal:  Abdomen is soft, nondistended, and nontender.  There was no obvious hepatosplenomegaly or abdominal mass.  There are no scars from previous surgery.    Musculoskeletal:  Good strength in all 4 extremities.   There is good range of motion in both shoulders.        Assessment:  1. At high risk for breast cancer    The patient is aware that high risk breast cancer patients are typically identified because of a genetic mutation, strong family history for breast cancer, LCIS, atypical hyperplasia, or history of radiation to the chest wall under the age of 30.  In her case the family history is the driving factor in her risk.  Her mother has negative genetic testing which should clear the patient.  Our risk assessment models of estimated her lifetime risk at 58%.  The 5-year risk was not estimated because of her age.  Based on these findings the patient could consider adding supplemental MRI imaging to her yearly 3D mammography.  She has a lot of young breast cancer in the family and I think it would be reasonable to start imaging now.  Our plans are to  obtain an MRI in 6 months and then 6 months later get her initial mammogram.    We also discussed the benefits of exercise, maintaining ideal body weight, and limiting alcohol intake.      Plan:  1.  Orders were placed for an MRI in August 2024.  I will call her with those results.  2.  We will obtain mammograms and February 2025.  3.  I would like to see her back in the office in 1 year.  She is in the process of coming under the care of an OB/GYN physician.      CPT coding:    Next Appointment:  No follow-ups on file.            EMR Dragon/transcription disclaimer:    Much of this encounter note is an electronic transcription/translocation of spoken language to printed text.  The electronic translation of spoken language may permit erroneous, or at times, nonsensical words or phrases to be inadvertently transcribed.  Although I have reviewed the note from such areas, some may still exist.

## 2024-02-21 NOTE — LETTER
February 21, 2024     DAKOTA Mendoza  [address]  [address]  [place] [address]    Patient: Joseline Kelly   YOB: 1995   Date of Visit: 2/21/2024     Dear DAKOTA Mendoza:       Thank you for referring Joseline Kelly to me for evaluation. Below are the relevant portions of my assessment and plan of care.    Assessment:  1. At high risk for breast cancer    The patient is aware that high risk breast cancer patients are typically identified because of a genetic mutation, strong family history for breast cancer, LCIS, atypical hyperplasia, or history of radiation to the chest wall under the age of 30.  In her case the family history is the driving factor in her risk.  Her mother has negative genetic testing which should clear the patient.  Our risk assessment models of estimated her lifetime risk at 58%.  The 5-year risk was not estimated because of her age.  Based on these findings the patient could consider adding supplemental MRI imaging to her yearly 3D mammography.  She has a lot of young breast cancer in the family and I think it would be reasonable to start imaging now.  Our plans are to obtain an MRI in 6 months and then 6 months later get her initial mammogram.    We also discussed the benefits of exercise, maintaining ideal body weight, and limiting alcohol intake.      Plan:  1.  Orders were placed for an MRI in August 2024.  I will call her with those results.  2.  We will obtain mammograms and February 2025.  3.  I would like to see her back in the office in 1 year.  She is in the process of coming under the care of an OB/GYN physician.    If you have questions, please do not hesitate to call me. I look forward to following Joseline along with you.         Sincerely,        Abe Brenner MD        CC: No Recipients    Abe Brenner MD  02/21/24 1424  Sign when Signing Visit  Chief Complaint: Joseline Kelly is a 29 y.o.. female here today for  Consult        History of Present Illness:  Patient presents with management of breast cancer risk.   She is a nice 29-year-old white female who moved here from [place] in 2021.  She has a very strong family history for breast cancer.  Her mother was first diagnosed at the age of 30.  She then developed cancer in the other breast at the age of 53 and then had an axillary recurrence on the original side in her 60s.  The mother did have genetic testing at  in 2019 which was negative.  The patient's maternal grandmother also had breast cancer diagnosed at the age of 40 and there was a maternal great uncle that had breast cancer.    The patient has not had any breast biopsies and has had no children.  She is on birth control.    There was the question of a palpable abnormality in the right axilla and a recent ultrasound of the axilla shows some normal-appearing lymph nodes both in size and architecture.    Review of Systems:  Review of Systems   Skin:         The patient denies any noticeable changes to the skin of the breast.   All other systems reviewed and are negative.     I have reviewed the ROS as documented by the MA/LPN/RN Abe Brenner MD      Past Medical and Surgical History:  Breast Biopsy History:  Patient has not had a breast biopsy in the past.  Breast Cancer HIstory:  Patient does not have a past medical history of breast cancer.  Breast Operations, and year:  0  Social History     Tobacco Use   Smoking Status Never   • Passive exposure: Never   Smokeless Tobacco Never     Obstetric History:  Patient is premenopausal, first day of last period: 2-  Number of pregnancies:0  Number of live births: 0  Number of abortions or miscarriages: 0  Age of delivery of first child: 0  Patient did not breast feed.  Length of time taking birth control pills IUD  Patient has never taken hormone replacement    Past Surgical History:   Procedure Laterality Date   • COLPOSCOPY  2018   • WISDOM TOOTH  EXTRACTION         Past Medical History:   Diagnosis Date   • Abnormal Pap smear of cervix    • Allergic     [facility]   • COVID-19 11/2020 1/2022   • GERD (gastroesophageal reflux disease)    • HPV (human papilloma virus) infection    • Polycystic ovary syndrome        Prior Hospitalizations, other than for surgery or childbirth, and year:  None    Social History:  Patient is single.  Patient has no children.    Family History:  Family History   Problem Relation Age of Onset   • Breast cancer Mother    • Cancer Mother         Breast Cancer (3)   • Hyperlipidemia Father    • Migraine headaches Father    • No Known Problems Sister    • Multiple sclerosis Maternal Grandmother    • Cancer Maternal Grandmother         Breast Cancer   • Other Maternal Grandmother         Multiple Sclerosis   • Thyroid disease Paternal Grandmother    • Heart attack Paternal Grandfather    • Other Paternal Grandfather         Heart Attack   • Colon cancer Paternal Great-Grandfather    • Cancer Paternal Uncle         Testicular Cancer   • Hyperlipidemia Paternal Uncle    • Hyperlipidemia Paternal Uncle        Vital Signs:  Vitals:    02/21/24 1332   BP: 135/99   Pulse: 83   SpO2: 98%       Medications:    Current Outpatient Prescriptions:     Current Outpatient Medications:   •  fluticasone (FLONASE) 50 MCG/ACT nasal spray, 2 sprays into the nostril(s) as directed by provider Daily for 30 days. (Patient taking differently: 2 sprays into the nostril(s) as directed by provider Daily. PATIENT IS STILL TAKING), Disp: 16 g, Rfl: 0    Physical Examination:  General Appearance:   Patient is in no distress.  She is well kept and has a BMI of 28.4.  Psychiatric:  Patient with appropriate mood and affect. Alert and oriented to self, time, and place.    Breast, RIGHT:  medium sized, symmetric with the contralateral side.  Breast skin is without erythema, edema, rashes.  There are no visible abnormalities upon inspection during the arm-raising maneuver  or with hands on hips in the sitting position. There is no nipple retraction, discharge or nipple/areolar skin changes.There are no masses palpable in the sitting or supine positions.  The breast tissue is generally on the dense side.    Breast, LEFT:  medium sized, symmetric with the contralateral side.  Breast skin is without erythema, edema, rashes.  There are no visible abnormalities upon inspection during the arm-raising maneuver or with hands on hips in the sitting position. There is no nipple retraction, discharge or nipple/areolar skin changes.There are no masses palpable in the sitting or supine positions.  She has a similar pattern of dense breast tissue on this side as well.    Lymphatic:  There is no axillary, cervical, infraclavicular, or supraclavicular adenopathy bilaterally.    Gastrointestinal:  Abdomen is soft, nondistended, and nontender.  There was no obvious hepatosplenomegaly or abdominal mass.  There are no scars from previous surgery.    Musculoskeletal:  Good strength in all 4 extremities.   There is good range of motion in both shoulders.        Assessment:  1. At high risk for breast cancer    The patient is aware that high risk breast cancer patients are typically identified because of a genetic mutation, strong family history for breast cancer, LCIS, atypical hyperplasia, or history of radiation to the chest wall under the age of 30.  In her case the family history is the driving factor in her risk.  Her mother has negative genetic testing which should clear the patient.  Our risk assessment models of estimated her lifetime risk at 58%.  The 5-year risk was not estimated because of her age.  Based on these findings the patient could consider adding supplemental MRI imaging to her yearly 3D mammography.  She has a lot of young breast cancer in the family and I think it would be reasonable to start imaging now.  Our plans are to obtain an MRI in 6 months and then 6 months later get her  initial mammogram.    We also discussed the benefits of exercise, maintaining ideal body weight, and limiting alcohol intake.      Plan:  1.  Orders were placed for an MRI in August 2024.  I will call her with those results.  2.  We will obtain mammograms and February 2025.  3.  I would like to see her back in the office in 1 year.  She is in the process of coming under the care of an OB/GYN physician.      CPT coding:    Next Appointment:  No follow-ups on file.            EMR Dragon/transcription disclaimer:    Much of this encounter note is an electronic transcription/translocation of spoken language to printed text.  The electronic translation of spoken language may permit erroneous, or at times, nonsensical words or phrases to be inadvertently transcribed.  Although I have reviewed the note from such areas, some may still exist.

## 2024-03-12 ENCOUNTER — TELEPHONE (OUTPATIENT)
Dept: INTERNAL MEDICINE | Facility: CLINIC | Age: 29
End: 2024-03-12
Payer: COMMERCIAL

## 2025-04-11 ENCOUNTER — HOSPITAL ENCOUNTER (OUTPATIENT)
Dept: MAMMOGRAPHY | Facility: HOSPITAL | Age: 30
Discharge: HOME | End: 2025-04-11
Admitting: SURGERY
Payer: COMMERCIAL

## 2025-04-11 DIAGNOSIS — Z12.39 SCREENING BREAST EXAMINATION: ICD-10-CM

## 2025-04-11 PROCEDURE — 77067 SCR MAMMO BI INCL CAD: CPT

## 2025-04-11 PROCEDURE — 77063 BREAST TOMOSYNTHESIS BI: CPT

## 2025-04-21 ENCOUNTER — RESULTS FOLLOW-UP (OUTPATIENT)
Dept: MAMMOGRAPHY | Facility: CLINIC | Age: 30
End: 2025-04-21
Payer: COMMERCIAL

## 2025-04-21 NOTE — TELEPHONE ENCOUNTER
----- Message from Tima Garcia sent at 4/21/2025  8:05 AM EDT -----  She hasn't been seen in over a year.  If she wants to continue here she needs to make an appointment   ----- Message -----  From: Ting Snow MA  Sent: 4/18/2025   1:42 PM EDT  To: DAKOTA Khan      ----- Message -----  From: Interface, Rad Results [place] In  Sent: 4/17/2025  11:40 AM EDT  To: [facility] Cristy Manager Results Routin#

## 2025-04-21 NOTE — TELEPHONE ENCOUNTER
Pt stated she knows her mammogram came back normal she does not want to have an appt at this time she did ask about toro an Mri I told her she has not been seen in over a year she will have to be seen in order to get that schedule   Pt stated she might call back